# Patient Record
Sex: MALE | Race: WHITE | NOT HISPANIC OR LATINO | URBAN - NONMETROPOLITAN AREA
[De-identification: names, ages, dates, MRNs, and addresses within clinical notes are randomized per-mention and may not be internally consistent; named-entity substitution may affect disease eponyms.]

---

## 2019-04-02 ENCOUNTER — IMPORTED ENCOUNTER (OUTPATIENT)
Dept: URBAN - NONMETROPOLITAN AREA CLINIC 1 | Facility: CLINIC | Age: 59
End: 2019-04-02

## 2019-04-02 PROBLEM — H52.4: Noted: 2019-04-02

## 2019-04-02 PROBLEM — H25.13: Noted: 2019-04-02

## 2019-04-02 PROBLEM — H52.223: Noted: 2019-04-02

## 2019-04-02 PROBLEM — H52.03: Noted: 2019-04-02

## 2019-04-02 PROCEDURE — 99203 OFFICE O/P NEW LOW 30 MIN: CPT

## 2019-04-02 PROCEDURE — 92015 DETERMINE REFRACTIVE STATE: CPT

## 2019-04-02 NOTE — PATIENT DISCUSSION
Nuclear Sclerosis OU-  discussed findings w/patient-  no treatment indicated at this time-  UV protection recommended-  monitor yearly or prn Compound Hyperopic Astigmatism OU w/Presbyopia-  discussed findings w/patient-  new spectacle Rx issued for FT wear-  monitor yearly or prn; 's Notes: MR 4/2/2019DFE 4/2/2019

## 2020-05-19 ENCOUNTER — IMPORTED ENCOUNTER (OUTPATIENT)
Dept: URBAN - NONMETROPOLITAN AREA CLINIC 1 | Facility: CLINIC | Age: 60
End: 2020-05-19

## 2020-05-19 PROBLEM — H52.4: Noted: 2020-05-19

## 2020-05-19 PROBLEM — H52.03: Noted: 2020-05-19

## 2020-05-19 PROBLEM — H52.223: Noted: 2020-05-19

## 2020-05-19 PROBLEM — H25.13: Noted: 2020-05-19

## 2020-05-19 PROCEDURE — 92014 COMPRE OPH EXAM EST PT 1/>: CPT

## 2020-05-19 PROCEDURE — 92015 DETERMINE REFRACTIVE STATE: CPT

## 2020-05-19 NOTE — PATIENT DISCUSSION
Compound Hyperopic Astigmatism OU w/Presbyopia-  discussed findings w/patient-  new spectacle Rx issued for FT wear-  monitor yearly or prn Nuclear Sclerosis OU-  discussed findings w/patient-  no treatment indicated at this time-  UV protection recommended-  monitor yearly or prn; 's Notes: MR 5/19/2020DFE 5/19/2020

## 2021-04-06 ENCOUNTER — PREPPED CHART (OUTPATIENT)
Dept: URBAN - NONMETROPOLITAN AREA CLINIC 4 | Facility: CLINIC | Age: 61
End: 2021-04-06

## 2021-04-06 ENCOUNTER — IMPORTED ENCOUNTER (OUTPATIENT)
Dept: URBAN - NONMETROPOLITAN AREA CLINIC 1 | Facility: CLINIC | Age: 61
End: 2021-04-06

## 2021-04-06 PROCEDURE — 92014 COMPRE OPH EXAM EST PT 1/>: CPT

## 2021-04-06 PROCEDURE — 92015 DETERMINE REFRACTIVE STATE: CPT

## 2021-04-06 NOTE — PATIENT DISCUSSION
Compound Hyperopic Astigmatism OU w/Presbyopia-  discussed findings w/patient-  mild changes noted OD-  new spectacle Rx issued-  monitor yearly or prn Nuclear Sclerosis OU-  discussed findings w/patient-  no treatment indicated at this time-  UV protection recommended-  monitor yearly or prn; 's Notes: MR 4/6/2021D 4/6/2021

## 2022-04-06 ASSESSMENT — TONOMETRY
OS_IOP_MMHG: 17
OD_IOP_MMHG: 16

## 2022-04-06 ASSESSMENT — VISUAL ACUITY
OS_CC: 20/20-1
OD_CC: 20/25

## 2022-04-11 ENCOUNTER — COMPREHENSIVE EXAM (OUTPATIENT)
Dept: URBAN - NONMETROPOLITAN AREA CLINIC 4 | Facility: CLINIC | Age: 62
End: 2022-04-11

## 2022-04-11 DIAGNOSIS — H52.223: ICD-10-CM

## 2022-04-11 DIAGNOSIS — H52.4: ICD-10-CM

## 2022-04-11 DIAGNOSIS — H52.03: ICD-10-CM

## 2022-04-11 PROCEDURE — 92014 COMPRE OPH EXAM EST PT 1/>: CPT

## 2022-04-11 PROCEDURE — 92015 DETERMINE REFRACTIVE STATE: CPT

## 2022-04-11 ASSESSMENT — TONOMETRY
OS_IOP_MMHG: 16
OD_IOP_MMHG: 16

## 2022-04-11 ASSESSMENT — VISUAL ACUITY
OD_CC: 20/20-1
OS_CC: 20/20-2

## 2022-04-15 ASSESSMENT — VISUAL ACUITY
OS_PH: 20/20
OD_CC: 20/60-1
OS_SC: 20/20-1
OU_CC: 20/20
OU_CC: 20/20
OD_PH: 20/25
OS_SC: 20/30+1
OS_CC: 20/50
OD_SC: 20/25+2
OU_CC: 20/50
OD_SC: 20/25

## 2022-04-15 ASSESSMENT — TONOMETRY
OD_IOP_MMHG: 16
OS_IOP_MMHG: 17
OD_IOP_MMHG: 21
OD_IOP_MMHG: 18
OS_IOP_MMHG: 17
OS_IOP_MMHG: 19

## 2023-10-04 NOTE — PATIENT DISCUSSION
Monitor. Ivermectin Pregnancy And Lactation Text: This medication is Pregnancy Category C and it isn't known if it is safe during pregnancy. It is also excreted in breast milk.